# Patient Record
Sex: FEMALE | Employment: FULL TIME | URBAN - METROPOLITAN AREA
[De-identification: names, ages, dates, MRNs, and addresses within clinical notes are randomized per-mention and may not be internally consistent; named-entity substitution may affect disease eponyms.]

---

## 2021-05-20 ENCOUNTER — TRANSCRIBE ORDERS (OUTPATIENT)
Dept: PHYSICAL THERAPY | Facility: CLINIC | Age: 41
End: 2021-05-20

## 2021-05-20 DIAGNOSIS — S93.602A FOOT SPRAIN, LEFT, INITIAL ENCOUNTER: Primary | ICD-10-CM

## 2021-05-20 DIAGNOSIS — S93.402A SPRAIN OF LEFT ANKLE, UNSPECIFIED LIGAMENT, INITIAL ENCOUNTER: ICD-10-CM

## 2021-05-27 ENCOUNTER — TREATMENT (OUTPATIENT)
Dept: PHYSICAL THERAPY | Facility: CLINIC | Age: 41
End: 2021-05-27

## 2021-05-27 DIAGNOSIS — S93.602S FOOT SPRAIN, LEFT, SEQUELA: Primary | ICD-10-CM

## 2021-05-27 PROCEDURE — 97110 THERAPEUTIC EXERCISES: CPT | Performed by: PHYSICAL THERAPIST

## 2021-05-27 PROCEDURE — 97161 PT EVAL LOW COMPLEX 20 MIN: CPT | Performed by: PHYSICAL THERAPIST

## 2021-05-27 PROCEDURE — 97014 ELECTRIC STIMULATION THERAPY: CPT | Performed by: PHYSICAL THERAPIST

## 2021-05-27 PROCEDURE — 97530 THERAPEUTIC ACTIVITIES: CPT | Performed by: PHYSICAL THERAPIST

## 2021-05-27 NOTE — PROGRESS NOTES
Physical Therapy Initial Evaluation and Plan of Care    Patient: Iris Phoenix   : 1980  Diagnosis/ICD-10 Code:  Foot sprain, left, sequela [S93.602S]  Referring practitioner: Juliocesar Felder MD  Date of Initial Evaluation:  Type: THERAPY  Noted: 2021  _______________________________________________________________________________________________________________________    Subjective Evaluation    History of Present Illness  Date of onset: 2021  Mechanism of injury: She fell into a manhole at a filling station. The cover was loose and hit her foot along the dorsal aspect and anterior leg.  She has been trying to decrease her Ibuprofen but had to go back to prescription    She is having increased pain along her lateral leg when she wears pants.  Wearing pants increases her lateral leg pain. She is unable to go down stairs.  She is working to improve her stepping on her steps outside her home using 3-4 steps. She reports she is able to walk on her heel but not on her toes.    Subjective comment: Her ankle is at a 4/10, foot at 3/10 and lateral calf is at 8/10  Patient Occupation: [employer]. P -  Pain  Current pain ratin  At best pain ratin  At worst pain ratin  Location: left foot, ankle and lateral.   Quality: radiating, sharp, cramping, discomfort and dull ache  Relieving factors: ice, heat, medications and support (the boot helps with pain and stability)  Aggravating factors: ambulation, stairs and repetitive movement    Social Support  Lives in: multiple-level home  Lives with: spouse    Patient Goals  Patient goals for therapy: decreased edema, decreased pain, improved balance, increased motion, increased strength, independence with ADLs/IADLs, return to work and return to sport/leisure activities  Patient goal: working with dog           Objective          Static Posture     Head  Forward.    Shoulders  Rounded.    Lumbar Spine   Increased lordosis.     Knee   Genu  valgus.     Ankle/Foot   Ankle/Foot (Left): Supinated.     Observations   Left Ankle/Foot   Positive for edema.     Additional Ankle/Foot Observation Details  Mild swelling anterior left foot distal to talus    Palpation   Left   Tenderness of the anterior tibialis, lateral gastrocnemius, medial gastrocnemius, peroneus and posterior tibialis.     Additional Palpation Details  Hypersensitive to touch along the lateral aspect of left calf and top of foot.  Large verbal and facial pain reactions with palpation.     Tenderness   Left Ankle/Foot   Tenderness in the anterior talofibular ligament, dorsum foot, peroneal tendon, posterior tibial tendon, talar dome and tarsals.     Additional Tenderness Details  Large pain reactions with palpation.       Active Range of Motion   Left Ankle/Foot   Plantar flexion: 45 degrees   Inversion: 30 degrees   Eversion: 10 degrees     Right Ankle/Foot   Normal active range of motion    Additional Active Range of Motion Details  Left dorsiflexion -10 actively passive +10 but with pain at the talus and large reaction from patient    Knee ROM is WNL    Strength/Myotome Testing     Left Ankle/Foot   Dorsiflexion: 2+  Plantar flexion: 4+  Inversion: 4+  Eversion: 4    Right Ankle/Foot   Normal strength    Additional Strength Details  Knee strength:  Extension Left 4-/5, right 4+/5                            Flexion Left 3+/5, right 4+/5    Swelling   Left Ankle/Foot   Metatarsal heads: 21.5 cm  Figure 8: 49.5 cm    Right Ankle/Foot   Metatarsal heads: 21.5 cm  Figure 8: 49.5 cm    Ambulation     Observational Gait   Gait: antalgic   Increased right stance time. Decreased walking speed, stride length, left stance time, left swing time and right swing time.   Left arm swing: high guard  Right arm swing: high guard  Base of support: decreased    Additional Observational Gait Details  When ambulating without boot on she walks with decreased weight bearing on left and primarily on toes, leg  internally rotated. She does display facial grimacing while walking short distance either with the brace on or off.      General Comments     Ankle/Foot Comments   Mid foot left 24 cm; right 23 cm     Subjective Questionnaire: FAAM 41/84 or 49% disability        Assessment & Plan     Assessment  Impairments: abnormal gait, abnormal or restricted ROM, activity intolerance, impaired physical strength, lacks appropriate home exercise program and pain with function  Assessment details: Iris Phoenix is a 40 y.o. year-old female referred to physical therapy for left foot pain. 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  She has  No comorbidities or personal factors that may affect her progress in the plan of care.  She presents with painful left foot, ankle and lateral calf with all activities, limited ROM and strength, hypersensitivity to touch on lateral calf and ankle, limited ability to ambulate due to pain   Prognosis: good  Functional Limitations: lifting, walking, pushing, uncomfortable because of pain, standing and unable to perform repetitive tasks  Goals  Plan Goals: STG (2 weeks)  1.  Pt will be independent with ROM and flexibility exercises for improved mobility of left foot with all functional tasks.  2.  Pt will ambulate community distances without boot and minimal compensatory patterns.  3.  Pt will report decreased pain at worst from 8/10 to 4/10 in her left foot with all activities.    LTG (4weeks)  1.  Pt will be independent with left foot and ankle strengthening and stabilization  exercises for improved mobility of left foot with all functional tasks and job duties  2.  Pt will be able to drive her truck with use of left foot on the clutch.  3.  Pt will demonstrate WFL  left ankle ROM with minimal pain.  4.  Pt will demonstrate improved left ankle strength to 4+/5 for stability with job duties and functional activities    Plan  Therapy options: will be seen for skilled physical therapy services  Planned modality interventions: cryotherapy, electrical stimulation/Russian stimulation and thermotherapy (hydrocollator packs)  Planned therapy interventions: balance/weight-bearing training, flexibility, functional ROM exercises, gait training, home exercise program, manual therapy, neuromuscular re-education, soft tissue mobilization, strengthening, stretching and therapeutic activities  Duration in visits: 12  Treatment plan discussed with: patient  Plan details: Progress to light therband strengthening, toe/heel walking, sls on left foot; modalities PRN for pain.           Manual Therapy:         mins  30549;  Therapeutic Exercise:    20     mins  45124;     Neuromuscular Hope:        mins  89946;    Therapeutic Activity:     10     mins  19247;     Gait Training:           mins  21082;     Ultrasound:          mins  18071;    Work Hardening                 mins 82589  Iontophoresis                  mins 47066  Estim   15 min    Timed Treatment:   30   mins   Total Treatment:     60   mins    PT SIGNATURE: Edie Murcia PT           Initial Certification  Certification Period: 5/27/2021 thru 8/25/2021  I certify that the therapy services are furnished while this patient is under my care.  The services outlined above are required by this patient, and will be reviewed every 90 days.     PHYSICIAN: Juliocesar Felder MD      DATE:     Please sign and return via fax to [phone].. Thank you, [facility].

## 2021-06-03 ENCOUNTER — TREATMENT (OUTPATIENT)
Dept: PHYSICAL THERAPY | Facility: CLINIC | Age: 41
End: 2021-06-03

## 2021-06-03 DIAGNOSIS — S93.602S FOOT SPRAIN, LEFT, SEQUELA: Primary | ICD-10-CM

## 2021-06-03 PROCEDURE — 97014 ELECTRIC STIMULATION THERAPY: CPT | Performed by: PHYSICAL THERAPIST

## 2021-06-03 PROCEDURE — 97110 THERAPEUTIC EXERCISES: CPT | Performed by: PHYSICAL THERAPIST

## 2021-06-03 NOTE — PROGRESS NOTES
Physical Therapy Daily Treatment Note      Visit # 2      Subjective Evaluation    History of Present Illness    Subjective comment: Ankle is feeling better.  The pain along the calf is gone.  Still with tenderness along the top of the foot. Sorry about missing Tuesday - the Dose pac irritated her stomach and she was feeling bad.  Sees Dr. Felder today Pain  Current pain rating: 3           Objective          Active Range of Motion   Left Ankle/Foot   Dorsiflexion (ke): 8 degrees   Dorsiflexion (kf): 45 degrees   Inversion: 30 degrees   Eversion: 15 degrees       See Exercise, Manual, and Modality Logs for complete treatment.       Assessment & Plan     Assessment  Assessment details: Iris Phoenix has been seen for 2 physical therapy sessions for left foot strain.  Treatment has included therapeutic exercise, desensitization, Ice, Estim, and patient education.. Progress to physical therapy goals is slow due to only having two sessions. Her ROM is improved and her pain levels are greatly reduced.She is still ambulating with moderately antalgic gait.  Please assess and advise on progression in PT.                     Manual Therapy:         mins  72999;  Therapeutic Exercise:    30     mins  57129;     Neuromuscular Hope:        mins  26752;    Therapeutic Activity:          mins  55488;     Gait Training:           mins  94479;     Ultrasound:          mins  82697;    Work Hardening                 mins 39356  Iontophoresis                  mins 36332  Estim   15 min    Timed Treatment:   30   mins   Total Treatment:     45   mins    Edie Murcia, PT  Physical Therapist

## 2021-06-07 ENCOUNTER — TREATMENT (OUTPATIENT)
Dept: PHYSICAL THERAPY | Facility: CLINIC | Age: 41
End: 2021-06-07

## 2021-06-07 DIAGNOSIS — S93.602S FOOT SPRAIN, LEFT, SEQUELA: Primary | ICD-10-CM

## 2021-06-07 PROCEDURE — 97110 THERAPEUTIC EXERCISES: CPT | Performed by: PHYSICAL THERAPIST

## 2021-06-07 PROCEDURE — 97014 ELECTRIC STIMULATION THERAPY: CPT | Performed by: PHYSICAL THERAPIST

## 2021-06-07 NOTE — PROGRESS NOTES
Physical Therapy Daily Progress Note    Visit # : 3  Iris Phoenix reports: my foot is feeling much better; I'm still having difficulty sitting  style.     Subjective     Objective   See Exercise, Manual, and Modality Logs for complete treatment.     Assessment/Plan  Pt is responding favorably to gentle exercise with decreasing pain and good mobility.  Able to transition to single leg stance today without reports of increased symptoms.  Pt was issued updated HEP printout to facilitate compliance and recall with ex progressions today.  Progress strengthening /stabilization /functional activity  Advance [facility] activities prn     Timed:  Manual Therapy:    -     mins  31776;  Therapeutic Exercise:    32     mins  46213;     Neuromuscular Hpoe:    -    mins  57906;    Therapeutic Activity:     -     mins  34157;     Gait Training:      -     mins  04295;     Ultrasound:     -     mins  90662;    Iontophoresis                 -     mins 99692    Untimed:  Electrical Stimulation:    15     mins  53533 ( );  Mech traction                   -     mins 74292  Dry Needling                  -     Min self pay    Timed Treatment:   32   mins   Total Treatment:     47   mins      Brooke Smith PT  Physical Therapist  [place] License # [id number]

## 2021-06-07 NOTE — PATIENT INSTRUCTIONS
Access Code: [email or web address]  URL: [email or web address]  Date: 06/07/2021  Prepared by: Brooke Smith    Exercises  Seated Ankle Dorsiflexion Stretch - 2 x daily - 1 sets - 10 reps - 5 hold  Standing Single Leg Stance with Counter Support - 1 x daily - 1 sets - 5 reps - 10 hold

## 2021-06-09 ENCOUNTER — TREATMENT (OUTPATIENT)
Dept: PHYSICAL THERAPY | Facility: CLINIC | Age: 41
End: 2021-06-09

## 2021-06-09 DIAGNOSIS — S93.602S FOOT SPRAIN, LEFT, SEQUELA: Primary | ICD-10-CM

## 2021-06-09 PROCEDURE — 97110 THERAPEUTIC EXERCISES: CPT | Performed by: PHYSICAL THERAPIST

## 2021-06-09 PROCEDURE — 97014 ELECTRIC STIMULATION THERAPY: CPT | Performed by: PHYSICAL THERAPIST

## 2021-06-09 PROCEDURE — 97530 THERAPEUTIC ACTIVITIES: CPT | Performed by: PHYSICAL THERAPIST

## 2021-06-09 NOTE — PROGRESS NOTES
"MD note      Patient: Iris Phoenix   : 1980  Diagnosis/ICD-10 Code:  Foot sprain, left, sequela [S93.602S]  Referring practitioner: Juliocesar Felder MD  Date of Initial Visit: Type: THERAPY  Noted: 2021  Today's Date: 2021  Patient seen for 4 sessions      Subjective:   Clinical Progress: improved  Home Program Compliance: Yes  Treatment has included: therapeutic exercise, therapeutic activity, electrical stimulation and moist heat    Subjective Evaluation    History of Present Illness  Mechanism of injury: Pt notes improved symptoms and getting used to walking without bracing.          Objective          Palpation   Left   No palpable tenderness to the lateral gastrocnemius and medial gastrocnemius.     Tenderness   Left Ankle/Foot   Tenderness in the dorsum foot, first metatarsal head and tarsals. No tenderness in the talar dome.     Active Range of Motion   Left Ankle/Foot   Dorsiflexion (ke): 5 degrees   Dorsiflexion (kf): 45 degrees   Inversion: 40 degrees with pain  Eversion: 20 degrees     Strength/Myotome Testing     Left Ankle/Foot   Dorsiflexion: 4+  Plantar flexion: 4+  Inversion: 3-  Eversion: 4    Functional Assessment     Single Leg Stance   Left single leg stance time: 30 sec with medial cunieform pain at the end of the test.    Comments  Pain with step up onto first (12\") rung of ladder      Assessment/Plan  Ms Phoenix has progressed well in physical therapy with improved strength and ROM.   She does continue to exhibit tenderness along the medial cunieform and noticed pain with 12\" step up onto ladder.  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   PT Signature: Brooke Smith, PT  [place] License # [id number]    Based upon review of the patient's progress and continued therapy plan, it is my medical opinion that Iris Phoenix should continue physical therapy treatment at " [facility]  [address] [place] [id number] [phone].    Signature: __________________________________  Juliocesar Felder MD    Timed:  Manual Therapy:    -     mins  15434;  Therapeutic Exercise:    35     mins  64201;     Neuromuscular Hope:    -    mins  26125;    Therapeutic Activity:     8     mins  56205;     Gait Training:      -     mins  15856;     Ultrasound:     -     mins  79850;    Iontophoresis                 -     mins 73582    Untimed:  Electrical Stimulation:    15     mins  14518 ( );  Mech traction                   -     mins 14571  Dry Needling                  -     Min self pay    Timed Treatment:   43   mins   Total Treatment:     58   mins

## 2021-11-16 ENCOUNTER — DOCUMENTATION (OUTPATIENT)
Dept: PHYSICAL THERAPY | Facility: CLINIC | Age: 41
End: 2021-11-16

## 2021-11-16 NOTE — PROGRESS NOTES
Physical Therapy Discharge Summary      Iris Phoenix was seen for 4 physical therapy sessions for left foot sprain.  Treatment included therapeutic exercise, therapeutic activity, electrical stimulation  and patient education with home exercise program . Progress to physical therapy goals was good.Ms. Phoenix had progressed will in physical therapy but had some pain with stepping up onto ladder.  She returned to [facility] and did not return to physical therapy. She is discharged from PT at this time.

## 2022-09-12 ENCOUNTER — TRANSCRIBE ORDERS (OUTPATIENT)
Dept: PHYSICAL THERAPY | Facility: CLINIC | Age: 42
End: 2022-09-12

## 2022-09-12 DIAGNOSIS — S63.502A WRIST SPRAIN, LEFT, INITIAL ENCOUNTER: Primary | ICD-10-CM
